# Patient Record
(demographics unavailable — no encounter records)

---

## 2024-12-05 NOTE — HISTORY OF PRESENT ILLNESS
[FreeTextEntry1] : 71-year-old male with a history of severe obstructive sleep apnea who has been treated with CPAP therapy over the past 5 years.  This prescription is APAP 5-15 cm of water pressure.  The 90th percentile pressure is 8.5 cm of water pressure.  The average AHI on therapy is within normal limits at 3.0/h.  He uses the device for 6 hours and 40 minutes nightly.  He has several devices in different locations and therefore the adherence data for the current download is not reflective of his total usage of CPAP therapy which she states he uses every night.  He uses a nasal pillows mask interface and states that he is benefiting from therapy with resolution of sleep apnea related symptomatology.  He has no daytime somnolence at present.  His [place] Sleepiness Scale score is 3.  There have been no other new interim medical problems.

## 2024-12-05 NOTE — ASSESSMENT
[FreeTextEntry1] : 71-year-old male with a history of severe obstructive sleep apnea doing well with APAP therapy 5-15 cm of water pressure.  He uses PAP therapy on a regular basis although he has several machines in different locations.  The therapy based AHI is within normal limits of at 3 events per hour.  He is benefiting from CPAP therapy and will continue its use as prescribed.  His current device is more than 5 years old and was a refurbished [employer] DreamStation auto CPAP device.  A new device will be ordered for him and he will continue with CPAP therapy as prescribed.

## 2025-02-28 NOTE — HISTORY OF PRESENT ILLNESS
[FreeTextEntry1] : This is a 72-year-old male for evaluation and treatment of his hypertension hypercholesterolemia history of atrial flutter paroxysmal and sleep apnea [de-identified] : Patient is feeling well he is utilizing his CPAP his primary concern at this time is his inability to lose weight

## 2025-02-28 NOTE — ASSESSMENT
[FreeTextEntry1] : This is a 72-year-old male, whose history has been reviewed above  Stasis on losartan low-dose and diltiazem both for blood pressure control and to prevent dysrhythmias  He has a history of hypercholesterolemia remains on atorvastatin a cholesterol profile was obtained medication changes predicated on the results  He does have a history of paroxysmal atrial for flutter he remains on Xarelto he clinically is in sinus today  He is concerned with his weight he has sleep apnea he has atrial arrhythmias we will start him on Zepbound I did review the side effects and benefits particularly GI anesthesia hospitalizations and pancreatitis

## 2025-03-06 NOTE — HISTORY OF PRESENT ILLNESS
[de-identified] : Patient comes in for ear clogging and feels he is overdue for an ear cleaning. He does not have any change sin hearing, no ringing in the ears or dizziness. He does not have any nasal congestion or runny nose

## 2025-03-06 NOTE — PHYSICAL EXAM
[Midline] : trachea located in midline position [Normal] : no rashes [de-identified] : cerumen removed from the bilateral ear canals with curette, once removed, normal ear canals

## 2025-03-06 NOTE — END OF VISIT
[FreeTextEntry3] : I, Dr. Childress personally performed the evaluation and management (E/M) services , including all procedures, for this established patient who presents today with (a) new problem(s)/exacerbation of (an) existing condition(s). That E/M includes conducting the clinically appropriate interval history &/or exam, assessing all new/exacerbated conditions, and establishing a new plan of care. Today, my HEMAL, Beverley Dumas, was here to observe &/or participate in the visit & follow plan of care established by me.

## 2025-05-23 NOTE — ASSESSMENT
[FreeTextEntry1] : This is a thin appearing 72-year-old male whose history has been reviewed above  He has a history of hypertension his blood pressure is under excellent control he has no orthostasis he remains on diltiazem for blood pressure control and rate control in addition he is on losartan appropriate blood tests including BUN and creatinine were obtained  He has a history of hypercholesterolemia remains on atorvastatin a cholesterol profile has been obtained medication changes predicated on the results  In terms of changing diltiazem I do not think this is a good idea his blood pressure may suffer a stroke and his rate.  We will increase his Zepbound as he is at a plateau.  But he does know he should be exercising and dieting  In addition we will send him to physiatry for his neck pain  I did ask him to follow-up with cardiology in terms of his medication and rhythm

## 2025-05-23 NOTE — PHYSICAL EXAM
[No JVD] : no jugular venous distention [No Edema] : there was no peripheral edema [Normal] : soft, non-tender, non-distended, no masses palpated, no HSM and normal bowel sounds [de-identified] : Overweight

## 2025-05-23 NOTE — HISTORY OF PRESENT ILLNESS
[FreeTextEntry1] : This is a 72-year-old male for evaluation and treatment of his hypertension and atrial fibrillation hypercholesterolemia and weight loss on Zepbound [de-identified] : Overall patient is feeling well however he seems to reached a plateau in terms of his weight.  He is having some cervical neck pain.  He is anxious to come off his diltiazem he has no cardiovascular complaints